# Patient Record
(demographics unavailable — no encounter records)

---

## 2025-02-27 NOTE — HISTORY OF PRESENT ILLNESS
[FreeTextEntry1] : Referring Physician: Jose D Odom MD  Dear Dr. Odom   Mrs. Benitez was seen in the [facility] today. For our records, please allow me to summarize the history and my findings.   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 On initial visit, she had presented to [facility] with acute decompensated HF. She was transferred to [facility] for LHC which revealed luminal irregularities. She underwent a KIKO/DCCV and was discharged on Metoprolol, Entresto, and Eliquis. On follow up, she was noted to be back in AF. She eventually underwent pulmonary vein, pulmonary wall, and CTI ablation on 3/13/2023. Since ablation, she has been doing well. Her EF has recovered since. She is now off Entresto due to low BPs.  She continues to be on metoprolol.  Eliquis was discontinued at last visit. She has had no recurrent palpitations or symptoms of A-fib.   Mrs. Benitez denies any recent history of chest pain, shortness of breath, palpitations, dizziness, or syncope.

## 2025-02-27 NOTE — CARDIOLOGY SUMMARY
[de-identified] : 7/19/23: SR at 72 bpm 3/1/23: Atrial fibrillation at 85 bpm, nonspecific T wave abnormalities.  4/20/23: Sinus rhythm at 72 bpm 1/24/2024: Sinus rhythm at 66 bpm, negative T waves 2/19/2025: Sinus rhythm at 77bpm [de-identified] : TTE EF 30% Moderate-severe mitral regurgitation & severe systolic dysfunction 4/21/23 EF 75% [de-identified] : 2/6 LHC: Luminal irregularities. RFA access site \par  2/6 RHC: RA: 7, PA: 36/22, PCWP: 18, CO: 2.51, CI: 1.38. RFV access site.  [de-identified] : 3/13/2023: Conclusions \par  - Pulmonary vein isolatin with achievment of bidirectional block \par  - Posterior wall isolation with achievement of bidirectional block \par  - Firing of all four veins and posterior wall noted \par  - CTI ablation with achievement of bidirectional block \par

## 2025-02-27 NOTE — CARDIOLOGY SUMMARY
[de-identified] : 7/19/23: SR at 72 bpm 3/1/23: Atrial fibrillation at 85 bpm, nonspecific T wave abnormalities.  4/20/23: Sinus rhythm at 72 bpm 1/24/2024: Sinus rhythm at 66 bpm, negative T waves 2/19/2025: Sinus rhythm at 77bpm [de-identified] : TTE EF 30% Moderate-severe mitral regurgitation & severe systolic dysfunction 4/21/23 EF 75% [de-identified] : 2/6 LHC: Luminal irregularities. RFA access site \par  2/6 RHC: RA: 7, PA: 36/22, PCWP: 18, CO: 2.51, CI: 1.38. RFV access site.  [de-identified] : 3/13/2023: Conclusions \par  - Pulmonary vein isolatin with achievment of bidirectional block \par  - Posterior wall isolation with achievement of bidirectional block \par  - Firing of all four veins and posterior wall noted \par  - CTI ablation with achievement of bidirectional block \par

## 2025-02-27 NOTE — HISTORY OF PRESENT ILLNESS
[FreeTextEntry1] : Referring Physician: Jose D Odom MD  Dear Dr. Odom   Mrs. Benitez was seen in the [facility] today. For our records, please allow me to summarize the history and my findings.   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 On initial visit, she had presented to [facility] with acute decompensated HF. She was transferred to [facility] for LHC which revealed luminal irregularities. She underwent a KIKO/DCCV and was discharged on Metoprolol, Entresto, and Eliquis. On follow up, she was noted to be back in AF. She eventually underwent pulmonary vein, pulmonary wall, and CTI ablation on 3/13/2023. Since ablation, she has been doing well. Her EF has recovered since. She is now off Entresto due to low BPs.  She continues to be on metoprolol.  Eliquis was discontinued at last visit. She has had no recurrent palpitations or symptoms of A-fib.   Mrs. Benitez denies any recent history of chest pain, shortness of breath, palpitations, dizziness, or syncope.

## 2025-02-27 NOTE — DISCUSSION/SUMMARY
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 She continues to do well from a cardiac standpoint. Her EF recovered post ablation. She continues to be in sinus rhythm. Her CHADSVASC score is 2.  Eliquis discontinued at last visit 1/2024.  I discussed that she should continue to monitor herself for A-fib with her Apple Watch and if she has evidence of atrial fibrillation should resume Eliquis.  She will return to the office in 1 year.  Mrs. Benitez appeared to understand the whole discussion and verbalized that all of her questions were answered to her satisfaction.  Thank you for allowing me to be involved in the care of this pleasant woman. Please feel free to contact me with any questions.